# Patient Record
Sex: FEMALE | Race: WHITE | Employment: OTHER | URBAN - METROPOLITAN AREA
[De-identification: names, ages, dates, MRNs, and addresses within clinical notes are randomized per-mention and may not be internally consistent; named-entity substitution may affect disease eponyms.]

---

## 2019-09-03 ENCOUNTER — OFFICE VISIT (OUTPATIENT)
Dept: NEUROLOGY | Age: 79
End: 2019-09-03

## 2019-09-03 VITALS
OXYGEN SATURATION: 98 % | RESPIRATION RATE: 16 BRPM | WEIGHT: 120 LBS | HEART RATE: 70 BPM | HEIGHT: 65 IN | DIASTOLIC BLOOD PRESSURE: 70 MMHG | SYSTOLIC BLOOD PRESSURE: 120 MMHG | TEMPERATURE: 98.5 F | BODY MASS INDEX: 19.99 KG/M2

## 2019-09-03 DIAGNOSIS — R47.1 DYSARTHRIA: Primary | ICD-10-CM

## 2019-09-03 RX ORDER — PYRIDOSTIGMINE BROMIDE 60 MG/1
TABLET ORAL
Refills: 5 | COMMUNITY
Start: 2019-07-03

## 2019-09-03 RX ORDER — POLYETHYLENE GLYCOL 3350 17 G/17G
17 POWDER, FOR SOLUTION ORAL
COMMUNITY

## 2019-09-03 RX ORDER — METHOTREXATE 2.5 MG/1
TABLET ORAL
COMMUNITY
End: 2019-09-03 | Stop reason: SDUPTHER

## 2019-09-03 RX ORDER — BRIMONIDINE TARTRATE 2 MG/ML
SOLUTION/ DROPS OPHTHALMIC
Refills: 1 | COMMUNITY
Start: 2019-08-21

## 2019-09-03 RX ORDER — PREDNISONE 10 MG/1
TABLET ORAL
Refills: 3 | COMMUNITY
Start: 2019-05-29

## 2019-09-03 RX ORDER — PYRIDOSTIGMINE BROMIDE 60 MG/1
120 TABLET ORAL
Qty: 180 TAB | Refills: 1 | Status: CANCELLED | OUTPATIENT
Start: 2019-09-03

## 2019-09-03 RX ORDER — FOLIC ACID 1 MG/1
1 TABLET ORAL
COMMUNITY

## 2019-09-03 RX ORDER — DORZOLAMIDE HYDROCHLORIDE AND TIMOLOL MALEATE 20; 5 MG/ML; MG/ML
1 SOLUTION/ DROPS OPHTHALMIC
COMMUNITY

## 2019-09-03 RX ORDER — TRAVOPROST OPHTHALMIC SOLUTION 0.04 MG/ML
1 SOLUTION OPHTHALMIC
COMMUNITY

## 2019-09-03 RX ORDER — METHOTREXATE 2.5 MG/1
12.5 TABLET ORAL
Qty: 20 TAB | Refills: 3 | Status: SHIPPED | OUTPATIENT
Start: 2019-09-08

## 2019-09-03 RX ORDER — HYDRALAZINE HYDROCHLORIDE 50 MG/1
TABLET, FILM COATED ORAL
Refills: 5 | COMMUNITY
Start: 2019-07-03

## 2019-09-03 NOTE — LETTER
9/3/19 Patient: Madison Hopkins YOB: 1940 Date of Visit: 9/3/2019 Nikky Grewal MD 
[address] [facility] [address] [place] [address] VIA Facsimile: [phone] Dear Nikky Grewal MD, Thank you for referring Ms. Sherice Milan to [facility] for evaluation. My notes for this consultation are attached. If you have questions, please do not hesitate to call me. I look forward to following your patient along with you. Sincerely, Onelia Pino MD

## 2019-09-03 NOTE — PROGRESS NOTES
NEUROLOGY NEW PATIENT OFFICE CONSULTATION      9/3/2019    RE: Norman Boone         1940      REFERRED BY:  Inessa Arana MD        CHIEF COMPLAINT:  This is Norman Boone is a [de-identified] y.o. female right handed  who had concerns including New Patient (Patient is here to estab care with a new neurologist.  She used to see Dr. Duke Ly in [facility] and is now living with her daugther. She has MG. She has noticed she is having trouble swallowing again.). HPI:     History is limited due to patient is poor historian and daughter does not know details of her medical history. 20 yrs ago, patient started seeing a neurologist Dr. Lane Russell at [facility] and was diagnosed with myasthenia gravis. Apparently, not sure if she was (+) for Achr Abs. Patient was placed on Mestinon 60 mg 2.5 mg (150 mg) QID. Curiously, patient's speech is better if she misses her medication. Dr Mary Quiñones her PCP recently increased her Mestinon and Prednisone (she was on for 5 yrs) was increased to 20 mg every day. On Methotrexate 2.5 mg 3 tabs for 20 yrs. Denies SOB, weakness of arms, diplopia, ptosis.     ROS   (-) fever  (-) rash  All other systems reviewed and are negative    Past Medical Hx  Past Medical History:   Diagnosis Date    Asthma     Hypertension     Myasthenia gravis ([facility] [address]       HTN  R hip surgery  Lower back surgeries  R CTS 2005    Social Hx  Social History     Socioeconomic History    Marital status: SINGLE     Spouse name: Not on file    Number of children: Not on file    Years of education: Not on file    Highest education level: Not on file   Tobacco Use    Smoking status: Never Smoker    Smokeless tobacco: Never Used   Lives with daugther    Family Hx  Family History   Problem Relation Age of Onset    Heart Disease Father     Stroke Father        ALLERGIES  Allergies   Allergen Reactions    Cefzil [Cefprozil] Anaphylaxis    Codeine Itching    Erythromycin Anaphylaxis    Penicillins Anaphylaxis    Sulfa (Sulfonamide Antibiotics) Anaphylaxis    Clarithromycin Other (comments)     \"excessive coughing\"    Hydromorphone Itching    Benazepril Itching    Omeprazole Itching and Rash       CURRENT MEDS          PREVIOUS WORKUP: (reviewed)  IMAGING:    CT Results (recent):  No results found for this or any previous visit. MRI Results (recent):  No results found for this or any previous visit. IR Results (recent):  No results found for this or any previous visit. VAS/US Results (recent):  No results found for this or any previous visit. LABS (reviewed)  No results found for this or any previous visit. Physical Exam:     Visit Vitals  /70 (BP 1 Location: Right arm, BP Patient Position: Sitting)   Pulse 70   Temp 98.5 °F (36.9 °C) (Oral)   Resp 16   Ht 5' 5\" (1.651 m)   Wt 54.4 kg (120 lb)   SpO2 98%   BMI 19.97 kg/m²     General:  Alert, cooperative, no distress. Head:  Normocephalic, without obvious abnormality, atraumatic. Eyes:  Conjunctivae/corneas clear. Lungs:  Heart:   Non labored breathing  Regular rate and rhythm, no carotid bruits   Abdomen:   Soft, non-distended   Extremities: Extremities normal, atraumatic, no cyanosis or edema. Pulses: 2+ and symmetric all extremities. Skin: Skin color, texture, turgor normal. No rashes or lesions.   Neurologic Exam     Gen: Attention normal             Language: strained speech, will stutter occasionally             Memory: intact recent and remote memory  Cranial Nerves:  I: smell Not tested   II: visual fields Full to confrontation   II: pupils Equal, round, reactive to light   II: optic disc No papilledema   III,VII: ptosis none   III,IV,VI: extraocular muscles  Full ROM   V: mastication normal   V: facial light touch sensation  normal   VII: facial muscle function   symmetric   VIII: hearing symmetric   IX: soft palate elevation  normal   XI: trapezius strength  5/5   XI: sternocleidomastoid strength 5/5 XI: neck flexion strength  5/5   XII: tongue  midline     Motor: normal bulk and tone, no tremor              Strength: Limited exam of R LE due to R hip pain, otherwise, rest is 5/5 with no fatigability  (-) ptosis  Sensory: intact to LT, PP, vibration, and JPS  Reflexes: 2+ throughout; Down going toes  Coordination: Good FTN and HTS  Gait: arthralgic due to R hip pain, uses a walker         Impression:     Domi Wen is a [de-identified] y.o. female who  has a past medical history of Asthma, Hypertension, and Myasthenia gravis ([address] , esophageal strictures with multiple esophageal dilatation, R hip surgery, Lower back surgery,  R CTS 2005 who 20 yrs ago, started seeing a neurologist Dr. Regan Mohs at [facility] and was diagnosed with myasthenia gravis. (patient can't remember her symptoms). Apparently, not sure if she was (+) for Achr Abs. Patient was placed on Mestinon 60 mg 2.5 mg (150 mg) QID. Curiously, patient's speech is better if she misses her medication. Dr Cindy Juan her PCP recently increased her Mestinon and Prednisone (she was on for 5 yrs) was increased to 20 mg every day. On Methotrexate 2.5 mg 3 tabs for 20 yrs. Denies SOB, weakness of arms, diplopia, ptosis. RECOMMENDATIONS  1. I had a long discussion with patient and daugther. Discussed diagnosis, prognosis, pathophysiology and available treatment. Reviewed test results. All questions were answered. 2. It is curious to note that her speech is better when she misses Mestinon, so I wonder if Mestinon actually causing her speech issues (strained). There might be a non-organic component to her speech (stuttering that goes away when distracted). We need to reevaluate if she truly has myasthenia gravis before we can proceed further  3. Will recheck Achr Abs  4. Blood test for BHAVANA, ESR, RF to look for rheumatologic causes of her symptoms  5. Decrease Mestinon 60 mg to 2 tabs TID. Patient to monitor for worsening  6. Will check Methotrexate level.  In the meantime will continue Methotrexate 2.5 mg 5 tabs every Sunday  7. Will continue Prednisone 10 mg 2 tabs every day for now  8. Patient to get medical records from previous neurologists for my review  9. Patient is scheduled to see a GI specialist for her esophageal strictures        Follow-up and Dispositions    · Return in about 1 month (around 10/3/2019).             Thank you for the consultation      Tiffany Bernal MD  Diplomate, American Board of Psychiatry and Neurology  Diplomate, Neuromuscular Medicine  Diplomate, American Board of Electrodiagnostic Medicine        CC: Romana Reyes MD  Fax: [phone]

## 2019-09-03 NOTE — PROGRESS NOTES
Chief Complaint   Patient presents with    New Patient     Patient is here to estab care with a new neurologist.  She used to see Dr. Booker Michele in [facility] and is now living with her daugther. She has MG. She has noticed she is having trouble swallowing again.      Visit Vitals  /70 (BP 1 Location: Right arm, BP Patient Position: Sitting)   Pulse 70   Temp 98.5 °F (36.9 °C) (Oral)   Resp 16   Ht 5' 5\" (1.651 m)   Wt 120 lb (54.4 kg)   SpO2 98%   BMI 19.97 kg/m²

## 2019-09-10 LAB
ACHR BIND AB SER-SCNC: 0.22 NMOL/L (ref 0–0.24)
ACHR BLOCK AB SER-ACNC: 22 % (ref 0–25)
ACHR MOD AB/ACHR TOTAL SFR SER: <12 % (ref 0–20)
ANA SER QL: NEGATIVE
ERYTHROCYTE [SEDIMENTATION RATE] IN BLOOD BY WESTERGREN METHOD: 25 MM/HR (ref 0–40)
MTX SERPL-SCNC: NORMAL UMOL/L (ref 0.02–5)
RHEUMATOID FACT SERPL-ACNC: <10 IU/ML (ref 0–13.9)
STRIA MUS AB TITR SER IF: NEGATIVE {TITER}

## 2019-09-12 ENCOUNTER — DOCUMENTATION ONLY (OUTPATIENT)
Dept: NEUROLOGY | Age: 79
End: 2019-09-12

## 2019-09-12 NOTE — PROGRESS NOTES
Reviewed medical records from [facility] neurological consultant. EMG nerve study with  rep stim care of Dr. Hector Manzo August 3, 2004 apparently showed decremental response of the of the right facial and right spinal accessory nerve. EMG nerve study showed bilateral median neuropathy localized to the carpal tunnel moderately severe in the right and mild to moderate slightly severe in the left. 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 At that point her main complaint was paroxysmal stiffening in her hand and numbness of all of the digits involving the right greater than left hand. Intermittent right-sided ptosis as well as difficulty swallowing and chewing predominantly of solids. She also had some paroxysmal diplopia particularly when looking at yellow lines in the road. This appears to be horizontal.    MRI of the brain in 2004 apparently showed a chronic left thalamic stroke. Blood test done:  Apparently was positive for all 3 acetylcholine receptor antibodies. 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 Patient was started on Mestinon 60 mg 3 times a day and still complain of weakness in the afternoon with complaints of tightness and spasm in her hand along with numbness and claudication. Eventually Mestinon was increased to 1-1/2 tab 3 times a day and then to 2 tabs 3 times a day and was started on Imuran 50 mg twice daily on October 2005. Prednisone seemed to make her worse. In 2006 she is now on prednisone 20 mg a day and was switched to methotrexate 12.5 mg a week (7.5 mg on Sunday and 5 mg on Wednesdays) but it makes her feel sick sick on occasion. Also on Mestinon 60 mg 2 tablets 3 times a day. Patient was doing relatively well. In 2008 patient is on prednisone 10 mg a day. Also on methotrexate 2.5 mg 3 tabs and Sunday and 2 on Wednesdays and on Mestinon 60 mg 2-1/2 tab in the morning 2-1/2 tab at noon and 2 at 5 PM and 2 at bedtime.   She was also taking some folic acid and Fosamax. In 2018 she is having symptoms of esophageal stenosis for which she had prior esophageal dilatation procedures. Saw Dr. Mica Tompkins Dr. next    Patient was last seen by her previous neurologist on July 11, 2019. At that time she was complaining of cognitive changes. They noted her to have hesitant speech. At that time he was advised to continue her current myasthenia gravis medication of methotrexate 2.5 mg 5 tabs oral Sunday tabs and Wednesday, Mestinon 60 mg 2.5 in a.m. 4 times daily, prednisone 10 mg 2 tabs daily          Other history:  Multiple prior lumbar decompression, obstructive sleep apnea, trigeminal neuralgia, carpal tunnel syndrome, degenerative joint disease.

## 2019-10-10 ENCOUNTER — OFFICE VISIT (OUTPATIENT)
Dept: NEUROLOGY | Age: 79
End: 2019-10-10

## 2019-10-10 VITALS
DIASTOLIC BLOOD PRESSURE: 60 MMHG | SYSTOLIC BLOOD PRESSURE: 160 MMHG | TEMPERATURE: 98.7 F | RESPIRATION RATE: 16 BRPM | OXYGEN SATURATION: 95 % | HEIGHT: 65 IN | WEIGHT: 120 LBS | HEART RATE: 75 BPM | BODY MASS INDEX: 19.99 KG/M2

## 2019-10-10 DIAGNOSIS — F80.81 STAMMERING/STUTTERING: ICD-10-CM

## 2019-10-10 DIAGNOSIS — R47.1 DYSARTHRIA: Primary | ICD-10-CM

## 2019-10-10 NOTE — PATIENT INSTRUCTIONS
PRESCRIPTION REFILL POLICY [facility] Statement to Patients April 1, 2014 In an effort to ensure the large volume of patient prescription refills is processed in the most efficient and expeditious manner, we are asking our patients to assist us by calling your Pharmacy for all prescription refills, this will include also your  Mail Order Pharmacy. The pharmacy will contact our office electronically to continue the refill process. Please do not wait until the last minute to call your pharmacy. We need at least 48 hours (2days) to fill prescriptions. We also encourage you to call your pharmacy before going to  your prescription to make sure it is ready. With regard to controlled substance prescription refill requests (narcotic refills) that need to be picked up at our office, we ask your cooperation by providing us with at least 72 hours (3days) notice that you will need a refill. We will not refill narcotic prescription refill requests after 4:00pm on any weekday, Monday through Thursday, or after 2:00pm on Fridays, or on the weekends. We encourage everyone to explore another way of getting your prescription refill request processed using [employer], our patient web portal through our electronic medical record system. [employer] is an efficient and effective way to communicate your medication request directly to the office and  downloadable as an tasha on your smart phone . [employer] also features a review functionality that allows you to view your medication list as well as leave messages for your physician. Are you ready to get connected? If so please review the attatched instructions or speak to any of our staff to get you set up right away! Thank you so much for your cooperation. Should you have any questions please contact our Practice Administrator. The Physicians and Staff,  [facility]

## 2019-10-10 NOTE — LETTER
10/10/2019 4:39 PM 
 
Patient: Philomena Ward YOB: 1940 Date of Visit: 10/10/2019 Dear Vandana Estrada MD 
[address] [facility] [address] [place] [id number] VIA Facsimile: [phone] 
 : 
 
 
Thank you for referring Ms. Daniella Reza to me for evaluation/treatment. Below are the relevant portions of my assessment and plan of care. If you have questions, please do not hesitate to call me. I look forward to following Ms. Baez along with you. Sincerely, Mulugeta Chaparro MD

## 2019-10-10 NOTE — PROGRESS NOTES
Chief Complaint   Patient presents with    Results     Patient is here to discuss lab results to see if she has MG.      Visit Vitals  /60 (BP 1 Location: Right arm, BP Patient Position: Sitting)   Pulse 75   Temp 98.7 °F (37.1 °C) (Oral)   Resp 16   Ht 5' 5\" (1.651 m)   Wt 54.4 kg (120 lb)   SpO2 95%   BMI 19.97 kg/m²

## 2019-10-10 NOTE — PROGRESS NOTES
Neurology Progress Note    Patient ID:  Charli Franco  <[id number]>  10 y.o.  1940      Subjective:   History: Charli Franco is a [de-identified] y.o. female who  has a past medical history of Asthma, Hypertension, and Myasthenia gravis ([address] , esophageal strictures with multiple esophageal dilatation, R hip surgery, Lower back surgery,  R CTS 2005 who 20 yrs ago, started seeing a neurologist Dr. Collin Santos at [facility] [place] and was diagnosed with myasthenia gravis. (patient can't remember her symptoms). Apparently, not sure if she was (+) for Achr Abs. Patient was placed on Mestinon 60 mg 2.5 mg (150 mg) QID. Curiously, patient's speech is better if she misses her medication. Dr Eunice Wilson her PCP recently increased her Mestinon and Prednisone (she was on for 5 yrs) was increased to 20 mg every day. On Methotrexate 2.5 mg 3 tabs for 20 yrs. Denies SOB, weakness of arms, diplopia, ptosis. Since last time, patient feels symptoms have improved. Reviewed medical records from outside:  Reviewed medical records from [facility] neurological consultant.      EMG nerve study with  rep stim care of Dr. Hinton Koyanagi August 3, 2004 apparently showed decremental response of the of the right facial and right spinal accessory nerve.     EMG nerve study showed bilateral median neuropathy localized to the carpal tunnel moderately severe in the right and mild to moderate slightly severe in the left. 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     At that point her main complaint was paroxysmal stiffening in her hand and numbness of all of the digits involving the right greater than left hand. Intermittent right-sided ptosis as well as difficulty swallowing and chewing predominantly of solids. She also had some paroxysmal diplopia particularly when looking at yellow lines in the road.   This appears to be horizontal.     MRI of the brain in 2004 apparently showed a chronic left thalamic stroke.     Blood test done:  Apparently was positive for all 3 acetylcholine receptor antibodies. 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     Patient was started on Mestinon 60 mg 3 times a day and still complain of weakness in the afternoon with complaints of tightness and spasm in her hand along with numbness and claudication. Eventually Mestinon was increased to 1-1/2 tab 3 times a day and then to 2 tabs 3 times a day and was started on Imuran 50 mg twice daily on October 2005. Prednisone seemed to make her worse.     In 2006 she is now on prednisone 20 mg a day and was switched to methotrexate 12.5 mg a week (7.5 mg on Sunday and 5 mg on Wednesdays) but it makes her feel sick sick on occasion. Also on Mestinon 60 mg 2 tablets 3 times a day. Patient was doing relatively well.     In 2008 patient is on prednisone 10 mg a day. Also on methotrexate 2.5 mg 3 tabs and Sunday and 2 on Wednesdays and on Mestinon 60 mg 2-1/2 tab in the morning 2-1/2 tab at noon and 2 at 5 PM and 2 at bedtime. She was also taking some folic acid and Fosamax.     In 2018 she is having symptoms of esophageal stenosis for which she had prior esophageal dilatation procedures. Saw Dr. Karla Mcfarland Dr. next     Patient was last seen by her previous neurologist on July 11, 2019. At that time she was complaining of cognitive changes. They noted her to have hesitant speech.   At that time he was advised to continue her current myasthenia gravis medication of methotrexate 2.5 mg 5 tabs oral Sunday tabs and Wednesday, Mestinon 60 mg 2.5 in a.m. 4 times daily, prednisone 10 mg 2 tabs daily     Other history:  Multiple prior lumbar decompression, obstructive sleep apnea, trigeminal neuralgia, carpal tunnel syndrome, degenerative joint disease.            ROS:  Per HPI-  Otherwise the remainder of ROS was negative    Social Hx  [unfilled]    Meds:  Current Outpatient Medications on File Prior to Visit   Medication Sig Dispense Refill    acetaminophen (TYLENOL) 325 mg suppository Insert 325 mg into rectum.  albuterol sulfate 90 mcg/actuation aepb Take  by inhalation.  brimonidine (ALPHAGAN) 0.2 % ophthalmic solution INSTILL 1 DROP INTO BOTH EYES TWICE A DAY  1    dorzolamide-timolol (COSOPT) 22.3-6.8 mg/mL ophthalmic solution 1 Drop.  pyridostigmine (MESTINON) 60 mg tablet TAKE 2.5 TABLETS (150 MG TOTAL) BY MOUTH 4 (FOUR) TIMES DAILY  5    travoprost (TRAVATAN Z) 0.004 % ophthalmic solution 1 Drop.  folic acid (FOLVITE) 1 mg tablet Take 1 mg by mouth.  hydrALAZINE (APRESOLINE) 50 mg tablet TAKE 1 TABLET BY MOUTH EVERY 8 HOURS  5    polyethylene glycol (MIRALAX) 17 gram packet Take 17 g by mouth.  methotrexate (RHEUMATREX) 2.5 mg tablet Take 5 Tabs by mouth every Sunday. 20 Tab 3    predniSONE (DELTASONE) 10 mg tablet TAKE 6 TABLETS FOR 5 DAYS, THEN DECREASE BY 1 TABLET EVERY 2 DAYS UNTIL OFF  3     No current facility-administered medications on file prior to visit. Imaging:    CT Results (recent):  No results found for this or any previous visit. MRI Results (recent):  No results found for this or any previous visit. IR Results (recent):  No results found for this or any previous visit. VAS/US Results (recent):  No results found for this or any previous visit.     Reviewed records in [facility] tab today    Lab Review     Office Visit on 09/03/2019   Component Date Value Ref Range Status    Methotrexate (MTX), Serum 09/04/2019 None Detected  0.02 - 5.00 umol/L Final    Comment:                              Potentially Toxic Ranges:                                        After 24hr >5.00                                        After 48hr >0.50                                        After 72hr >0.05                                  Detection Limit = 0.02          This test was developed and its performance          characteristics determined by [employer]. It has          not been cleared or approved by the Food and          Drug Administration.  AChR Binding Ab, serum 09/04/2019 0.22  0.00 - 0.24 nmol/L Final    Comment:                                Negative:   0.00 - 0.24                                 Borderline: 0.25 - 0.40                                 Positive:        > 0.40      AChR Blocking Ab 09/04/2019 22  0 - 25 % Final    Comment:                                Negative:      0 - 25                                 Borderline:   26 - 30                                 Positive:         >30  Results for this test are for research purposes  only by the assay's . The performance  characteristics of this product have not been  established. Results should not be used as a  diagnostic procedure without confirmation of the  diagnosis by another medically established  diagnostic product or procedure.  AChR Modulating Ab 09/04/2019 <12  0 - 20 % Final    Comment:                               Negative:          <21                                Equivocal:     21 - 25                                Positive:          >25   The assay is linear between values of 12 and 64. Those <12 and >64 are reported as such. No single   value for ACR-modulating antibody should be used as   a sole basis for diagnosis or response to therapy.  Anti-striation Ab 09/04/2019 Negative  Neg:<1:40 Final    Antinuclear Antibodies Direct 09/04/2019 Negative  Negative Final    Sed rate (ESR) 09/04/2019 25  0 - 40 mm/hr Final    Rheumatoid factor 09/04/2019 <10.0  0.0 - 13.9 IU/mL Final         Objective:       Exam:  Visit Vitals  /60 (BP 1 Location: Right arm, BP Patient Position: Sitting)   Pulse 75   Temp 98.7 °F (37.1 °C) (Oral)   Resp 16   Ht 5' 5\" (1.651 m)   Wt 54.4 kg (120 lb)   SpO2 95%   BMI 19.97 kg/m²     Gen: Awake, alert, follows commands  Appropriate appearance  Oriented to all spheres.   No visual field defect on confrontation exam.  Full eyes movement, with no nystagmus, no diplopia, no ptosis. Normal gag and swallow. All remaining cranial nerves were normal  Language: strained speech, will stutter occasionally  Motor: normal bulk and tone, no tremor              Strength: Limited exam of R LE due to R hip pain, otherwise, rest is 5/5 with no fatigability  (-) ptosis  Sensory: intact to LT, PP, vibration, and JPS  Reflexes: 2+ throughout; Down going toes  Coordination: Good FTN and HTS  Gait: arthralgic due to R hip pain, uses a walker    Assessment:       ICD-10-CM ICD-9-CM    1. Dysarthria R47.1 784.51    2. Stammering/stuttering F80.81 315.35        Recent Achr Ab was negative (although historically she was positive as per her previous neurologist)    Plan:   1. I had a long discussion with patient and daughters. I recommend the most prudent thing to do is to start from scratch. 2. Since Methotrexate level is undetected (although daughter claims she takes them), she can just stop Methotrexate for now  3. Will also taper Mestinon to 60 mg 1 tab TID (given a schedule) since she felt she is better  4. Will continue Prednisone 10 mg 2 tabs every day for now  5. Follow up with GI specialist for her esophageal strictures      Follow-up and Dispositions    · Return in about 4 weeks (around 11/7/2019). Spent additional 1 hour reviewing medical records, lab results and imaging results from previous neurologist/physicans.           Todd Beach MD  Diplomate, American Board of Psychiatry and Neurology  Diplomate, Neuromuscular Medicine  Diplomate, American Board of Electrodiagnostic Medicine

## 2019-10-30 RX ORDER — BUDESONIDE AND FORMOTEROL FUMARATE DIHYDRATE 160; 4.5 UG/1; UG/1
AEROSOL RESPIRATORY (INHALATION)
COMMUNITY
Start: 2019-09-11

## 2019-10-30 RX ORDER — TAPENTADOL HYDROCHLORIDE 50 MG/1
TABLET, FILM COATED ORAL
Refills: 0 | COMMUNITY
Start: 2019-09-23

## 2019-10-31 ENCOUNTER — OFFICE VISIT (OUTPATIENT)
Dept: ORTHOPEDIC SURGERY | Age: 79
End: 2019-10-31

## 2019-10-31 VITALS
BODY MASS INDEX: 21.16 KG/M2 | WEIGHT: 127 LBS | TEMPERATURE: 98 F | OXYGEN SATURATION: 95 % | HEART RATE: 85 BPM | SYSTOLIC BLOOD PRESSURE: 136 MMHG | DIASTOLIC BLOOD PRESSURE: 58 MMHG | RESPIRATION RATE: 18 BRPM | HEIGHT: 65 IN

## 2019-10-31 DIAGNOSIS — M77.12 LATERAL EPICONDYLITIS, LEFT ELBOW: ICD-10-CM

## 2019-10-31 DIAGNOSIS — M70.61 TROCHANTERIC BURSITIS, RIGHT HIP: Primary | ICD-10-CM

## 2019-10-31 RX ORDER — HYDROCODONE BITARTRATE AND ACETAMINOPHEN 5; 325 MG/1; MG/1
TABLET ORAL
Refills: 0 | COMMUNITY
Start: 2019-08-06

## 2019-10-31 RX ORDER — KETOROLAC TROMETHAMINE 5 MG/ML
SOLUTION OPHTHALMIC
Refills: 1 | COMMUNITY
Start: 2019-10-24

## 2019-10-31 RX ORDER — BETAMETHASONE SODIUM PHOSPHATE AND BETAMETHASONE ACETATE 3; 3 MG/ML; MG/ML
6 INJECTION, SUSPENSION INTRA-ARTICULAR; INTRALESIONAL; INTRAMUSCULAR; SOFT TISSUE ONCE
Qty: 1 ML | Refills: 0
Start: 2019-10-31 | End: 2019-10-31

## 2019-10-31 NOTE — PROGRESS NOTES
10/31/2019    Chief Complaint: Right hip pain    HPI: This is a an 75-year-old female who did have a cephalo-medullary nail placed approximately 6 months ago at another facility. She did spend some time in the ICU, she has multiple medical issues. She is here for second opinion regarding her pains. She complains of right hip pain which is activity dependent. The patient has had activity dependent pain for the entirety of her 6 months recovery. The pain is located in the lateral aspect of the hip, it radiates somewhat distally and laterally, it is severe in intensity. The patient complains of difficulty sleeping on the right side, limitation in the normal activities of daily living. The patient has tried activity modification, she has tried over the counter pain medications, she is currently in physical therapy, injections have not been attempted. She denies any other surgery or pertinent history to this hip. She also complains of left elbow pain which is new since her injury. Of note, she was not on a walker prior to this issue.     Past Medical History:   Diagnosis Date    Asthma     Hypertension     Myasthenia gravis [facility])        Past Surgical History:   Procedure Laterality Date    HAND/FINGER SURGERY UNLISTED Right 11/2005    HX APPENDECTOMY      HX BACK SURGERY      x 3    HX CARPAL TUNNEL RELEASE Right 11/2005    HX CHOLECYSTECTOMY      HX HIP REPLACEMENT Right 06/01/2019    Dr. Eunice Travis ([facility])    HX KNEE ARTHROSCOPY      HX KNEE ARTHROSCOPY Right 06/2019    HX ORTHOPAEDIC  2006    arm/hand    HX ORTHOPAEDIC      foot surgery    HX ORTHOPAEDIC Right 11/2005    R elbow    HX TONSILLECTOMY      HX TOTAL ABDOMINAL HYSTERECTOMY      HX UROLOGICAL         Current Outpatient Medications on File Prior to Visit   Medication Sig Dispense Refill    HYDROcodone-acetaminophen (NORCO) 5-325 mg per tablet TAKE 1-2 TABLETS EVERY 4 HOURS AS NEEED FOR MODERATE OR SEVERE PAIN  0    ketorolac (ACULAR) 0.5 % ophthalmic solution INSTILL 1 DROP BY OPHTHALMIC ROUTE 4 TIMES EVERY DAY INTO EYE THAT HAD LASER X 1 WEEK THEN STOP  1    SYMBICORT 160-4.5 mcg/actuation HFAA       NUCYNTA 50 mg tablet TAKE 1 TABLET (50 MG TOTAL) BY MOUTH EVERY 6 (SIX) HOURS AS NEEDED FOR SEVERE PAIN FOR UP TO 14 DAYS  0    acetaminophen (TYLENOL) 325 mg suppository Insert 325 mg into rectum.  albuterol sulfate 90 mcg/actuation aepb Take  by inhalation.  brimonidine (ALPHAGAN) 0.2 % ophthalmic solution INSTILL 1 DROP INTO BOTH EYES TWICE A DAY  1    dorzolamide-timolol (COSOPT) 22.3-6.8 mg/mL ophthalmic solution 1 Drop.  pyridostigmine (MESTINON) 60 mg tablet TAKE 2.5 TABLETS (150 MG TOTAL) BY MOUTH 4 (FOUR) TIMES DAILY  5    travoprost (TRAVATAN Z) 0.004 % ophthalmic solution 1 Drop.  folic acid (FOLVITE) 1 mg tablet Take 1 mg by mouth.  hydrALAZINE (APRESOLINE) 50 mg tablet TAKE 1 TABLET BY MOUTH EVERY 8 HOURS  5    polyethylene glycol (MIRALAX) 17 gram packet Take 17 g by mouth.  methotrexate (RHEUMATREX) 2.5 mg tablet Take 5 Tabs by mouth every Sunday. 20 Tab 3    predniSONE (DELTASONE) 10 mg tablet TAKE 6 TABLETS FOR 5 DAYS, THEN DECREASE BY 1 TABLET EVERY 2 DAYS UNTIL OFF  3     No current facility-administered medications on file prior to visit. Allergies   Allergen Reactions    Benazepril Itching    Cefzil [Cefprozil] Anaphylaxis    Codeine Itching    Erythromycin Anaphylaxis    Penicillins Anaphylaxis    Sulfa (Sulfonamide Antibiotics) Anaphylaxis    Clarithromycin Other (comments)     \"excessive coughing\"    Hydrocodone Itching     Oxycodone, codeine excessive itching.      Hydromorphone Itching    Omeprazole Itching and Rash       Family History   Problem Relation Age of Onset    Heart Disease Father     Stroke Father        Social History     Socioeconomic History    Marital status: SINGLE     Spouse name: Not on file    Number of children: Not on file    Years of education: Not on file    Highest education level: Not on file   Tobacco Use    Smoking status: Never Smoker    Smokeless tobacco: Never Used   Substance and Sexual Activity    Alcohol use: Not Currently    Drug use: Never         Review of Systems:       General: Denies headache, lethargy, fever, weight loss  Ears/Nose/Throat: Denies ear discharge, drainage, nosebleeds, hoarse voice, dental problems  Cardiovascular: Denies chest pain, shortness of breath  Lungs: Denies chest pain, breathing problems, wheezing, pneumonia  Stomach: Denies stomach pain, heartburn, constipation, irritable bowel  Skin: Denies rash, sores, open wounds  Musculoskeletal: Admits to lateral hip pain and swelling, this pain is severe and causes difficulty walking. This pain is severe, made better by rest.   There is decreased mobility, and severe difficulty doing normal activities of daily living secondary to the pain. Genitourinary: Denies dysuria, hematuria, polyuria  Gastrointestinal: Denies constipation, obstipation, diarrhea  Neurological: Denies changes in sight, smell, hearing, taste, seizures. Denies loss of consciousness.   Psychiatric: Denies depression, sleep pattern changes, anxiety, change in personality  Endocrine: Denies mood swings, heat or cold intolerance  Hematologic/Lymphatic: Denies anemia, purpura, petechia  Allergic/Immunologic: Denies swelling of throat, pain or swelling at lymph nodes      Physical Examination:    Visit Vitals  /58 (BP 1 Location: Left arm, BP Patient Position: Sitting)   Pulse 85   Temp 98 °F (36.7 °C) (Oral)   Resp 18   Ht 5' 5\" (1.651 m)   Wt 127 lb (57.6 kg)   SpO2 95%   BMI 21.13 kg/m²        General: AOX3, no apparent distress  Psychiatric: mood and affect appropriate  Lungs: breathing is symmetric and unlabored bilaterally  Heart: regular rate and rhythm  Abdomen: no guarding  Head: normocephalic, atraumatic  Skin: No significant abnormalities, good turgor  Sensation intact to light touch: L1-S1 dermatomes  Muscular exam: 5/5 strength in all major muscle groups unless noted in specialty exam.    Extremities      Left upper extremity: Full active and passive range of motion without pain, deformity, no open wound, strength 5/5 in all major muscle groups. Right upper extremity: Full active and passive range of motion without pain, deformity, no open wound, strength 5/5 in all major muscle groups. Left lower extremity: Patient has significant tenderness to palpation in common extensor tendon insertion at the distal humerus, full active and passive range of motion is noted of the wrist, limited range of motion of the elbow secondary to pain in this regard. Wrist extension in a resisted fashion reproduces her pain. Right lower extremity:  No deformity is noted. Circumduction of the hip causes no pain in the groin. Palpation of the abductors as well as lateral aspect of the hip at the peritrochanteric bursa region is severely painful and reproductive of the chief complaint. Range of motion is full, with a negative impingement sign. VANDA test is negative. Gait is antalgic. Sensation is intact to light touch in the L1-S1 dermatomes. Skin is intact about the hip. Hip flexion strength is 5/5 and abduction strength is 4/5, hip extension and knee extension as well as tibialis anterior and EHL/GCS are 5/5 strength. Diagnostics:    Pertinent Xrays:  Pelvis and hip xrays indicate a healing intertrochanteric hip fracture with cephalo-medullary nail in good position. This is an image from July, there is partial healing at the time of this x-ray. Leg screw appears to be somewhat prominent laterally after collapse of the fracture. Assessment: Hardware irritation, right hip, lateral epicondylitis, left elbow    Plan: This patient and I did discuss at length the anatomy, which I drew out in office.   We discussed the potential causes of the issue, as well as the treatment options, which are largely nonoperative. We discussed that a mixture of anti-inflammatory analgesics, cortisone injections, as well as consistent physical therapy for 4-6 weeks is the standard of care of this issue. Evidence indicates that over 90% of patients can begin to resolve their issues in that time. The patient will proceed with ESR, CRP as well as aspiration attempt diagnostic and therapeutic injection into the right hip lag screw area, she will also have an injection into her lateral epicondylar area for symptomatic purposes as well as a physical therapy prescription. She will follow-up with her lab results as well as for cortisone into the area of leg screw irritation     procedures:     After consent was signed, the patient's right hip was prepped using a chlorhexidine scrub. Once  sterile, a timeout was performed and a 22 gauge needle was used to inject 5cc of 1% lidocaine through the subcutaneous tissues and iliotibial band in the peritrochanteric area nearest the apex of tenderness. Once adequate anesthesia was confirmed, a mixture of 6mg of betamethasone and 5 cc of 1% lidocaine were injected below the iliotibial band in the same area. Needle was removed and a sterile dressing applied. Patient tolerated well without complication. Post injection instructions were given. After this was performed, attention was turned to the left elbow where the area of maximum tenderness along the common extensor tendon of the left elbow was sterilized with chlorhexidine prep, I used 3 cc of 1% lidocaine into the subcutaneous tissues for esthesia. Once this was adequately numb, I used a mixture of 6 mg of betamethasone and 2 cc of 1% lidocaine injected into this area. Needle was removed and a sterile dressing was applied. Patient tolerated well without complication. Post instructions were given. Ms. Ina Carroll has a reminder for a \"due or due soon\" health maintenance.  I have asked that she contact her primary care provider for follow-up on this health maintenance.

## 2019-10-31 NOTE — PROGRESS NOTES
Chief Complaint   Patient presents with    Hip Pain     Right hip pain     1. Have you been to the ER, urgent care clinic since your last visit? Hospitalized since your last visit? No    2. Have you seen or consulted any other health care providers outside of the [address] [place] [address] since your last visit? Include any pap smears or colon screening.  No

## 2019-11-07 DIAGNOSIS — M70.61 TROCHANTERIC BURSITIS, RIGHT HIP: ICD-10-CM

## 2019-11-07 DIAGNOSIS — M77.12 LATERAL EPICONDYLITIS, LEFT ELBOW: ICD-10-CM

## 2019-11-07 LAB
CRP SERPL-MCNC: 82 MG/L (ref 0–10)
ERYTHROCYTE [SEDIMENTATION RATE] IN BLOOD BY WESTERGREN METHOD: 57 MM/HR (ref 0–40)

## 2019-11-20 ENCOUNTER — OFFICE VISIT (OUTPATIENT)
Dept: ORTHOPEDIC SURGERY | Age: 79
End: 2019-11-20

## 2019-11-20 VITALS
HEART RATE: 85 BPM | SYSTOLIC BLOOD PRESSURE: 118 MMHG | TEMPERATURE: 97.7 F | HEIGHT: 65 IN | RESPIRATION RATE: 24 BRPM | BODY MASS INDEX: 21.13 KG/M2 | DIASTOLIC BLOOD PRESSURE: 58 MMHG | OXYGEN SATURATION: 95 %

## 2019-11-20 DIAGNOSIS — M25.551 PAIN IN RIGHT HIP: Primary | ICD-10-CM

## 2019-11-20 RX ORDER — SUCRALFATE 1 G/10ML
SUSPENSION ORAL
Refills: 2 | COMMUNITY
Start: 2019-11-11

## 2019-11-20 NOTE — PROGRESS NOTES
11/20/2019      CC: Right hip pain    HPI:      This is a [de-identified]y.o. year old patient who comes in for follow-up of her right hip, she is here for results review as well as repeat clinical exam.  She continues to have lateral hip pain, she is getting worse with increased swelling on that side. I did send her for ESR and CRP, she did get better with an injection into the lateral aspect of the hip at the level of the lag screw, but this was just lidocaine. I did not want to give cortisone if her inflammatory markers were elevated for fear of infection, she does have now elevated ESR and CRP, worse than it was 2 months ago. Her clinical scenario is worsening as well. PMH:  Past Medical History:   Diagnosis Date    Asthma     Hypertension     Myasthenia gravis ([facility] [address]        PSxHx:  Past Surgical History:   Procedure Laterality Date    HAND/FINGER SURGERY UNLISTED Right 11/2005    HX APPENDECTOMY      HX BACK SURGERY      x 3    HX CARPAL TUNNEL RELEASE Right 11/2005    HX CHOLECYSTECTOMY      HX HIP REPLACEMENT Right 06/01/2019    Dr. Freddie Bianchi ([facility])    HX KNEE ARTHROSCOPY      HX KNEE ARTHROSCOPY Right 06/2019    HX ORTHOPAEDIC  2006    arm/hand    HX ORTHOPAEDIC      foot surgery    HX ORTHOPAEDIC Right 11/2005    R elbow    HX TONSILLECTOMY      HX TOTAL ABDOMINAL HYSTERECTOMY      HX UROLOGICAL         Meds:    Current Outpatient Medications:     CARAFATE 100 mg/mL suspension, TAKE 10ML BY MOUTH 3 TIMES A DAY AS NEEDED, Disp: , Rfl: 2    ketorolac (ACULAR) 0.5 % ophthalmic solution, INSTILL 1 DROP BY OPHTHALMIC ROUTE 4 TIMES EVERY DAY INTO EYE THAT HAD LASER X 1 WEEK THEN STOP, Disp: , Rfl: 1    SYMBICORT 160-4.5 mcg/actuation HFAA, , Disp: , Rfl:     NUCYNTA 50 mg tablet, TAKE 1 TABLET (50 MG TOTAL) BY MOUTH EVERY 6 (SIX) HOURS AS NEEDED FOR SEVERE PAIN FOR UP TO 14 DAYS, Disp: , Rfl: 0    acetaminophen (TYLENOL) 325 mg suppository, Insert 325 mg into rectum. , Disp: , Rfl:    albuterol sulfate 90 mcg/actuation aepb, Take  by inhalation. , Disp: , Rfl:     brimonidine (ALPHAGAN) 0.2 % ophthalmic solution, INSTILL 1 DROP INTO BOTH EYES TWICE A DAY, Disp: , Rfl: 1    dorzolamide-timolol (COSOPT) 22.3-6.8 mg/mL ophthalmic solution, 1 Drop., Disp: , Rfl:     predniSONE (DELTASONE) 10 mg tablet, TAKE 6 TABLETS FOR 5 DAYS, THEN DECREASE BY 1 TABLET EVERY 2 DAYS UNTIL OFF, Disp: , Rfl: 3    pyridostigmine (MESTINON) 60 mg tablet, TAKE 2.5 TABLETS (150 MG TOTAL) BY MOUTH 4 (FOUR) TIMES DAILY, Disp: , Rfl: 5    travoprost (TRAVATAN Z) 0.004 % ophthalmic solution, 1 Drop., Disp: , Rfl:     folic acid (FOLVITE) 1 mg tablet, Take 1 mg by mouth., Disp: , Rfl:     hydrALAZINE (APRESOLINE) 50 mg tablet, TAKE 1 TABLET BY MOUTH EVERY 8 HOURS, Disp: , Rfl: 5    polyethylene glycol (MIRALAX) 17 gram packet, Take 17 g by mouth., Disp: , Rfl:     methotrexate (RHEUMATREX) 2.5 mg tablet, Take 5 Tabs by mouth every Sunday. , Disp: 20 Tab, Rfl: 3    HYDROcodone-acetaminophen (NORCO) 5-325 mg per tablet, TAKE 1-2 TABLETS EVERY 4 HOURS AS NEEED FOR MODERATE OR SEVERE PAIN, Disp: , Rfl: 0    All: Allergies   Allergen Reactions    Benazepril Itching    Cefzil [Cefprozil] Anaphylaxis    Codeine Itching    Erythromycin Anaphylaxis    Penicillins Anaphylaxis    Sulfa (Sulfonamide Antibiotics) Anaphylaxis    Clarithromycin Other (comments)     \"excessive coughing\"    Hydrocodone Itching     Oxycodone, codeine excessive itching.      Hydromorphone Itching    Omeprazole Itching and Rash       Social Hx:  Social History     Socioeconomic History    Marital status: SINGLE     Spouse name: Not on file    Number of children: Not on file    Years of education: Not on file    Highest education level: Not on file   Tobacco Use    Smoking status: Never Smoker    Smokeless tobacco: Never Used   Substance and Sexual Activity    Alcohol use: Not Currently    Drug use: Never       Family Hx:  Family History Problem Relation Age of Onset    Heart Disease Father     Stroke Father          Review of Systems:       General: Denies headache, lethargy, fever, weight loss  Ears/Nose/Throat: Denies ear discharge, drainage, nosebleeds, hoarse voice, dental problems  Cardiovascular: Denies chest pain, shortness of breath  Lungs: Denies chest pain, breathing problems, wheezing, pneumonia  Stomach: Denies stomach pain, heartburn, constipation, irritable bowel  Skin: Denies rash, sores, open wounds  Musculoskeletal: Right hip pain  Genitourinary: Denies dysuria, hematuria, polyuria  Gastrointestinal: Denies constipation, obstipation, diarrhea  Neurological: Denies changes in sight, smell, hearing, taste, seizures. Denies loss of consciousness. Psychiatric: Denies depression, sleep pattern changes, anxiety, change in personality  Endocrine: Denies mood swings, heat or cold intolerance  Hematologic/Lymphatic: Denies anemia, purpura, petechia  Allergic/Immunologic: Denies swelling of throat, pain or swelling at lymph nodes      Physical Examination:    Visit Vitals  /58 (BP 1 Location: Left arm, BP Patient Position: Sitting)   Pulse 85   Temp 97.7 °F (36.5 °C) (Oral)   Resp 24   Ht 5' 5\" (1.651 m)   SpO2 95%   BMI 21.13 kg/m²        General: AOX3, no apparent distress  Psychiatric: mood and affect appropriate  Lungs: breathing is symmetric and unlabored bilaterally  Heart: regular rate and rhythm  Abdomen: no guarding  Head: normocephalic, atraumatic  Skin: No significant abnormalities, good turgor  Sensation intact to light touch: C5-T1 dermatomes  Muscular exam: 5/5 strength in all major muscle groups unless noted in specialty exam.    Extremities      Right upper extremity: No exam  Left upper extremity: No exam  Right lower extremity: Severe tenderness to palpation at the level of the trochanteric nail lag screw. No fluctuance, no erythema, sensation is intact light touch in the L1-S1 dermatomes.   Ambulates with a walker, very unsteady gait. Left lower extremity: Extremity is examined, no evidence of gross deformity, no gross motor or sensory deficit. Full active and passive range of motion is noted. Muscle tone and bulk is within normal expected limits. Capillary refill is less than 2 seconds distally. 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  I have advised the mother and the daughter that this is a significant undertaking even though it would be a relatively limited surgery, the plan would be to remove the hardware and simply replace a shorter leg screw if this is a noninfected prosthesis, however if it is infected, this would require long-term antibiotics as well as hardware removal.  She stated her understanding and satisfaction, the plan will be to have her proceed with indium 111 scan, this is to rule out chronic infection. If it comes back even moderately equivocal, plan will be to proceed with long-term antibiotics with assumption that this is infected hardware. She stated her understanding and satisfaction, additionally, I will speak with her primary regarding pain management. Ms. Mj Sumner has a reminder for a \"due or due soon\" health maintenance. I have asked that she contact her primary care provider for follow-up on this health maintenance.

## 2019-11-20 NOTE — PROGRESS NOTES
Chief Complaint   Patient presents with    Hip Pain     f/u    Elbow Pain     f/u    Foot Pain     1. Have you been to the ER, urgent care clinic since your last visit? Hospitalized since your last visit? No    2. Have you seen or consulted any other health care providers outside of the [address] [place] [address] since your last visit? Include any pap smears or colon screening. No     Pt is in extreme pain, unable to sleep.

## 2019-11-22 ENCOUNTER — TELEPHONE (OUTPATIENT)
Dept: ORTHOPEDIC SURGERY | Age: 79
End: 2019-11-22

## 2019-11-26 DIAGNOSIS — M25.551 PAIN IN RIGHT HIP: Primary | ICD-10-CM

## 2019-11-27 DIAGNOSIS — M25.551 PAIN IN RIGHT HIP: Primary | ICD-10-CM

## 2019-12-09 ENCOUNTER — OFFICE VISIT (OUTPATIENT)
Dept: ORTHOPEDIC SURGERY | Age: 79
End: 2019-12-09

## 2019-12-09 VITALS
SYSTOLIC BLOOD PRESSURE: 132 MMHG | OXYGEN SATURATION: 96 % | TEMPERATURE: 98 F | HEIGHT: 65 IN | BODY MASS INDEX: 21.13 KG/M2 | HEART RATE: 84 BPM | DIASTOLIC BLOOD PRESSURE: 78 MMHG | RESPIRATION RATE: 18 BRPM

## 2019-12-09 DIAGNOSIS — T84.89XA: Primary | ICD-10-CM

## 2019-12-09 PROBLEM — T84.9XXA COMPLICATION OF ORTHOPEDIC DEVICE (HCC): Status: ACTIVE | Noted: 2019-12-09

## 2019-12-09 NOTE — PROGRESS NOTES
Chief Complaint   Patient presents with    Hip Pain     f/u     1. Have you been to the ER, urgent care clinic since your last visit? Hospitalized since your last visit? No    2. Have you seen or consulted any other health care providers outside of the [address] [place] [address] since your last visit? Include any pap smears or colon screening.  No      Pt has not been going to PT.

## 2019-12-10 NOTE — PROGRESS NOTES
12/9/2019      CC: Right hip pain    HPI:      This is a 78y.o. year old female who  presents for a follow up visit. The patient was last seen and diagnosed with hardware irritation right femur. The patient's treatments since the most recent visit have comprised of workup for infection, which was negative. The patient has had no relief of the chief complaint. Additionally, her pain limits all walking. PMH:  Past Medical History:   Diagnosis Date    Asthma     Hypertension     Myasthenia gravis ([address]        PSxHx:  Past Surgical History:   Procedure Laterality Date    HAND/FINGER SURGERY UNLISTED Right 11/2005    HX APPENDECTOMY      HX BACK SURGERY      x 3    HX CARPAL TUNNEL RELEASE Right 11/2005    HX CHOLECYSTECTOMY      HX HIP REPLACEMENT Right 06/01/2019    Dr. Grace Topete ([facility])    HX KNEE ARTHROSCOPY      HX KNEE ARTHROSCOPY Right 06/2019    HX ORTHOPAEDIC  2006    arm/hand    HX ORTHOPAEDIC      foot surgery    HX ORTHOPAEDIC Right 11/2005    R elbow    HX TONSILLECTOMY      HX TOTAL ABDOMINAL HYSTERECTOMY      HX UROLOGICAL         Meds:    Current Outpatient Medications:     CARAFATE 100 mg/mL suspension, TAKE 10ML BY MOUTH 3 TIMES A DAY AS NEEDED, Disp: , Rfl: 2    HYDROcodone-acetaminophen (NORCO) 5-325 mg per tablet, TAKE 1-2 TABLETS EVERY 4 HOURS AS NEEED FOR MODERATE OR SEVERE PAIN, Disp: , Rfl: 0    ketorolac (ACULAR) 0.5 % ophthalmic solution, INSTILL 1 DROP BY OPHTHALMIC ROUTE 4 TIMES EVERY DAY INTO EYE THAT HAD LASER X 1 WEEK THEN STOP, Disp: , Rfl: 1    SYMBICORT 160-4.5 mcg/actuation HFAA, , Disp: , Rfl:     NUCYNTA 50 mg tablet, TAKE 1 TABLET (50 MG TOTAL) BY MOUTH EVERY 6 (SIX) HOURS AS NEEDED FOR SEVERE PAIN FOR UP TO 14 DAYS, Disp: , Rfl: 0    acetaminophen (TYLENOL) 325 mg suppository, Insert 325 mg into rectum. , Disp: , Rfl:     albuterol sulfate 90 mcg/actuation aepb, Take  by inhalation. , Disp: , Rfl:     brimonidine (ALPHAGAN) 0.2 % ophthalmic solution, INSTILL 1 DROP INTO BOTH EYES TWICE A DAY, Disp: , Rfl: 1    dorzolamide-timolol (COSOPT) 22.3-6.8 mg/mL ophthalmic solution, 1 Drop., Disp: , Rfl:     predniSONE (DELTASONE) 10 mg tablet, TAKE 6 TABLETS FOR 5 DAYS, THEN DECREASE BY 1 TABLET EVERY 2 DAYS UNTIL OFF, Disp: , Rfl: 3    pyridostigmine (MESTINON) 60 mg tablet, TAKE 2.5 TABLETS (150 MG TOTAL) BY MOUTH 4 (FOUR) TIMES DAILY, Disp: , Rfl: 5    travoprost (TRAVATAN Z) 0.004 % ophthalmic solution, 1 Drop., Disp: , Rfl:     hydrALAZINE (APRESOLINE) 50 mg tablet, TAKE 1 TABLET BY MOUTH EVERY 8 HOURS, Disp: , Rfl: 5    polyethylene glycol (MIRALAX) 17 gram packet, Take 17 g by mouth., Disp: , Rfl:     folic acid (FOLVITE) 1 mg tablet, Take 1 mg by mouth., Disp: , Rfl:     methotrexate (RHEUMATREX) 2.5 mg tablet, Take 5 Tabs by mouth every Sunday. (Patient not taking: Reported on 12/9/2019), Disp: 20 Tab, Rfl: 3    All: Allergies   Allergen Reactions    Benazepril Itching    Cefzil [Cefprozil] Anaphylaxis    Codeine Itching    Erythromycin Anaphylaxis    Penicillins Anaphylaxis    Sulfa (Sulfonamide Antibiotics) Anaphylaxis    Clarithromycin Other (comments)     \"excessive coughing\"    Hydrocodone Itching     Oxycodone, codeine excessive itching.      Hydromorphone Itching    Omeprazole Itching and Rash       Social Hx:  Social History     Socioeconomic History    Marital status: SINGLE     Spouse name: Not on file    Number of children: Not on file    Years of education: Not on file    Highest education level: Not on file   Tobacco Use    Smoking status: Never Smoker    Smokeless tobacco: Never Used   Substance and Sexual Activity    Alcohol use: Not Currently    Drug use: Never       Family Hx:  Family History   Problem Relation Age of Onset    Heart Disease Father     Stroke Father          Review of Systems:       General: Denies headache, lethargy, fever, weight loss  Ears/Nose/Throat: Denies ear discharge, drainage, nosebleeds, hoarse voice, dental problems  Cardiovascular: Denies chest pain, shortness of breath  Lungs: Denies chest pain, breathing problems, wheezing, pneumonia  Stomach: Denies stomach pain, heartburn, constipation, irritable bowel  Skin: Denies rash, sores, open wounds  Musculoskeletal: right hip pain  Genitourinary: Denies dysuria, hematuria, polyuria  Gastrointestinal: Denies constipation, obstipation, diarrhea  Neurological: Denies changes in sight, smell, hearing, taste, seizures. Denies loss of consciousness. Psychiatric: Denies depression, sleep pattern changes, anxiety, change in personality  Endocrine: Denies mood swings, heat or cold intolerance  Hematologic/Lymphatic: Denies anemia, purpura, petechia  Allergic/Immunologic: Denies swelling of throat, pain or swelling at lymph nodes      Physical Examination:    Visit Vitals  /78 (BP 1 Location: Right arm, BP Patient Position: Sitting)   Pulse 84   Temp 98 °F (36.7 °C) (Oral)   Resp 18   Ht 5' 5\" (1.651 m)   SpO2 96%   BMI 21.13 kg/m²        General: AOX3, no apparent distress  Psychiatric: mood and affect appropriate  Lungs: breathing is symmetric and unlabored bilaterally  Heart: regular rate and rhythm  Abdomen: no guarding  Head: normocephalic, atraumatic  Skin: No significant abnormalities, good turgor  Sensation intact to light touch: C5-T1 dermatomes  Muscular exam: 5/5 strength in all major muscle groups unless noted in specialty exam.    Extremities      Right upper extremity: no exam  Left upper extremity:  No exam  Right lower extremity: No gross deformity. + TTP right hip which reproduces the chief complaint. No restriction to range of motion of the hip, knee, ankle. Muscle bulk is appropriate without wasting. Sensation is intact to light touch in the L1-S1 dermatomes. Capillary refill is less than 2 seconds in the fingers. Strength testing is 5/5 at the major muscle groups of the hip, knee, ankle.       Left lower extremity: No gross deformity. No restriction to range of motion of the hip, knee, ankle. Muscle bulk is appropriate without wasting. Sensation is intact to light touch in the L1-S1 dermatomes. Capillary refill is less than 2 seconds in the fingers. Strength testing is 5/5 at the major muscle groups of the hip, knee, ankle. Diagnostics:    Pertinent Diagnostics: none today    Assessment: hardware irritation right hip implant  Plan:    I discussed with the patient and daughter that pain management is the only treatment other than revision surgery to a shorter lag screw. Nonoperative management carries morbidity, as does surgery. She is looking for a definitive option. We did discuss the risks of surgery which include but are not limited to infection, nerve or blood vessel damage, failure of fixation, failure of any possible implant, need for reoperation, postoperative pain and swelling, intra-or postoperative fracture, postoperative dislocation, leg length inequality, need for reoperation, implant failure, death, disability, organ dysfunction, wound healing issues, DVT, PE, and the need for further procedures. The patient did freely state their understanding and satisfaction with our discussion. We will proceed after medical clearances. Ms. Shawn Noble has a reminder for a \"due or due soon\" health maintenance. I have asked that she contact her primary care provider for follow-up on this health maintenance.

## 2019-12-16 ENCOUNTER — OFFICE VISIT (OUTPATIENT)
Dept: ORTHOPEDIC SURGERY | Age: 79
End: 2019-12-16

## 2019-12-16 VITALS
HEIGHT: 65 IN | TEMPERATURE: 97.8 F | SYSTOLIC BLOOD PRESSURE: 156 MMHG | HEART RATE: 94 BPM | DIASTOLIC BLOOD PRESSURE: 64 MMHG | OXYGEN SATURATION: 96 % | WEIGHT: 126 LBS | BODY MASS INDEX: 20.99 KG/M2

## 2019-12-16 DIAGNOSIS — M25.461 EFFUSION, RIGHT KNEE: ICD-10-CM

## 2019-12-16 DIAGNOSIS — M25.561 ACUTE PAIN OF RIGHT KNEE: Primary | ICD-10-CM

## 2019-12-16 RX ORDER — BETAMETHASONE SODIUM PHOSPHATE AND BETAMETHASONE ACETATE 3; 3 MG/ML; MG/ML
6 INJECTION, SUSPENSION INTRA-ARTICULAR; INTRALESIONAL; INTRAMUSCULAR; SOFT TISSUE ONCE
Qty: 1 ML | Refills: 0
Start: 2019-12-16 | End: 2019-12-16

## 2019-12-16 NOTE — PROGRESS NOTES
12/16/2019    Chief Complaint: Right knee pain    HPI: This is a(n) 78 y.o. patient who complains of right knee pain. Onset was sudden. The patient has had activity dependent pain for 2 weeks. The patient has tried activity modification, she has not tried physical therapy, injections have not been attempted. The pain is in the knee and goes distally to the ankle which is associated with swelling, it is moderate in intensity. The pain causes some limitation with ambulation. The patient does not complain of feelings of instability in the knee. Past Medical History:   Diagnosis Date    Asthma     Hypertension     Myasthenia gravis ([address]     Other ill-defined conditions(799.89)     myasthenia gravis       Past Surgical History:   Procedure Laterality Date    HAND/FINGER SURGERY UNLISTED Right 11/2005    HX APPENDECTOMY      HX BACK SURGERY      x 3    HX CARPAL TUNNEL RELEASE Right 11/2005    HX CHOLECYSTECTOMY      HX GYN      hysterectomy    HX HEENT      T&A    HX HIP REPLACEMENT Right 06/01/2019    Dr. Dorina Crowe ([facility])    HX KNEE ARTHROSCOPY      HX KNEE ARTHROSCOPY Right 06/2019    HX ORTHOPAEDIC      back    HX ORTHOPAEDIC      knee, arm, hand, foot,     HX ORTHOPAEDIC  2006    arm/hand    HX ORTHOPAEDIC      foot surgery    HX ORTHOPAEDIC Right 11/2005    R elbow    HX TONSILLECTOMY      HX TOTAL ABDOMINAL HYSTERECTOMY      HX UROLOGICAL      bladder tac    HX UROLOGICAL         Current Outpatient Medications on File Prior to Visit   Medication Sig Dispense Refill    CARAFATE 100 mg/mL suspension TAKE 10ML BY MOUTH 3 TIMES A DAY AS NEEDED  2    ketorolac (ACULAR) 0.5 % ophthalmic solution INSTILL 1 DROP BY OPHTHALMIC ROUTE 4 TIMES EVERY DAY INTO EYE THAT HAD LASER X 1 WEEK THEN STOP  1    SYMBICORT 160-4.5 mcg/actuation HFAA       acetaminophen (TYLENOL) 325 mg suppository Insert 325 mg into rectum.  albuterol sulfate 90 mcg/actuation aepb Take  by inhalation.  brimonidine (ALPHAGAN) 0.2 % ophthalmic solution INSTILL 1 DROP INTO BOTH EYES TWICE A DAY  1    dorzolamide-timolol (COSOPT) 22.3-6.8 mg/mL ophthalmic solution 1 Drop.  predniSONE (DELTASONE) 10 mg tablet TAKE 6 TABLETS FOR 5 DAYS, THEN DECREASE BY 1 TABLET EVERY 2 DAYS UNTIL OFF  3    pyridostigmine (MESTINON) 60 mg tablet TAKE 2.5 TABLETS (150 MG TOTAL) BY MOUTH 4 (FOUR) TIMES DAILY  5    travoprost (TRAVATAN Z) 0.004 % ophthalmic solution 1 Drop.  polyethylene glycol (MIRALAX) 17 gram packet Take 17 g by mouth.  amLODIPine (NORVASC) 5 mg tablet Take 5 mg by mouth daily.  atenolol (TENORMIN) 25 mg tablet Take 25 mg by mouth daily.  pravastatin (PRAVACHOL) 20 mg tablet Take 20 mg by mouth daily.  pyridostigmine (MESTINON) 60 mg tablet Take 60 mg by mouth four (4) times daily. 2.5 tabs at am and noon, 2 tabs at 1700 and bed time.  Cholecalciferol, Vitamin D3, (VITAMIN D3) 1,000 unit cap Take 1,000 Units by mouth two (2) times a day.  MULTIVITAMIN WITH MINERALS (ELFEGO-WILLARD PO) Take 1 Tab by mouth two (2) times a day.  HYDROcodone-guaiFENesin (HYCOTUSS) 5-100 mg/5 mL syrup Take 5 mL by mouth four (4) times daily as needed.  travoprost (TRAVATAN Z) 0.004 % ophthalmic solution Administer 1 Drop to both eyes every evening.  HYDROcodone-acetaminophen (NORCO) 5-325 mg per tablet TAKE 1-2 TABLETS EVERY 4 HOURS AS NEEED FOR MODERATE OR SEVERE PAIN  0    NUCYNTA 50 mg tablet TAKE 1 TABLET (50 MG TOTAL) BY MOUTH EVERY 6 (SIX) HOURS AS NEEDED FOR SEVERE PAIN FOR UP TO 14 DAYS  0    folic acid (FOLVITE) 1 mg tablet Take 1 mg by mouth.  hydrALAZINE (APRESOLINE) 50 mg tablet TAKE 1 TABLET BY MOUTH EVERY 8 HOURS  5    methotrexate (RHEUMATREX) 2.5 mg tablet Take 5 Tabs by mouth every Sunday. (Patient not taking: Reported on 12/9/2019) 20 Tab 3    methotrexate (RHEUMATREX) 2.5 mg tablet Take 2.5 mg by mouth.       folic acid (FOLVITE) 1 mg tablet Take 1 mg by mouth daily.  aspirin 81 mg tablet Take 81 mg by mouth daily. No current facility-administered medications on file prior to visit. Allergies   Allergen Reactions    Benazepril Itching    Cefzil [Cefprozil] Anaphylaxis    Codeine Itching    Erythromycin Anaphylaxis    Omeprazole Unknown (comments)    Penicillins Anaphylaxis    Sulfa (Sulfonamide Antibiotics) Anaphylaxis    Benazepril Shortness of Breath    Biaxin [Clarithromycin] Swelling    Cefzil [Cefprozil] Swelling    Clarithromycin Other (comments)     \"excessive coughing\"    Codeine Hives    Erythromycin Swelling    Hydrocodone Itching     Oxycodone, codeine excessive itching.  Hydromorphone Itching    Pcn [Penicillins] Swelling    Sulfa (Sulfonamide Antibiotics) Swelling    Omeprazole Itching and Rash       Family History   Problem Relation Age of Onset    Heart Disease Father     Stroke Father        Social History     Socioeconomic History    Marital status: SINGLE     Spouse name: Not on file    Number of children: Not on file    Years of education: Not on file    Highest education level: Not on file   Tobacco Use    Smoking status: Never Smoker    Smokeless tobacco: Never Used   Substance and Sexual Activity    Alcohol use: Not Currently    Drug use: Never   Social History Narrative    ** Merged History Encounter **              Review of Systems:       General: Denies headache, lethargy, fever, weight loss  Ears/Nose/Throat: Denies ear discharge, drainage, nosebleeds, hoarse voice, dental problems  Cardiovascular: Denies chest pain, shortness of breath  Lungs: Denies chest pain, breathing problems, wheezing, pneumonia  Stomach: Denies stomach pain, heartburn, constipation, irritable bowel  Skin: Denies rash, sores, open wounds  Musculoskeletal: Admits to knee pain, no deformity.   Genitourinary: Denies dysuria, hematuria, polyuria  Gastrointestinal: Denies constipation, obstipation, diarrhea  Neurological: Denies changes in sight, smell, hearing, taste, seizures. Denies loss of consciousness. Psychiatric: Denies depression, sleep pattern changes, anxiety, change in personality  Endocrine: Denies mood swings, heat or cold intolerance  Hematologic/Lymphatic: Denies anemia, purpura, petechia  Allergic/Immunologic: Denies swelling of throat, pain or swelling at lymph nodes      Physical Examination:    Visit Vitals  /64 (BP 1 Location: Right arm, BP Patient Position: Sitting)   Pulse 94   Temp 97.8 °F (36.6 °C) (Oral)   Ht 5' 5\" (1.651 m)   Wt 126 lb (57.2 kg)   SpO2 96%   BMI 20.97 kg/m²        General: AOX3, no apparent distress  Psychiatric: mood and affect appropriate  Lungs: breathing is symmetric and unlabored bilaterally  Heart: regular rate and rhythm  Abdomen: no guarding  Head: normocephalic, atraumatic  Skin: No significant abnormalities, good turgor  Sensation intact to light touch: L1-S1 dermatomes  Muscular exam: 5/5 strength in all major muscle groups unless noted in specialty exam.    Extremities:      Left upper extremity: Full active and passive range of motion without pain, deformity, no open wound, strength 5/5 in all major muscle groups. Right upper extremity: Full active and passive range of motion without pain, deformity, no open wound, strength 5/5 in all major muscle groups. Left lower extremity: Full active and passive range of motion without pain, deformity, no open wound, strength 5/5 in all major muscle groups. Right lower extremity:  No deformity is noted. Range of motion of the knee is 0-125. Ligamentous testing of the knee indicates stability of the the MCL, LCL, PCL, and ACL. Lachman's, anterior and posterior drawer tests are specifically negative. No joint line tenderness to palpation medially or laterally. Popliteal area is unremarkable. Massive  effusion. No patellar crepitus.   Patella tracks centrally with a negative apprehension and grind test.  Pivot shift is negative. Strength testing is indicative of 5/5 strength at hip flexion, extension, knee flexion and extension, tibialis anterior, EHL, and FHL. Sensation is intact to light touch in the L1-S1 dermatomes. Capillary refill is less than 2 seconds in the toes. Diagnostics:    Pertinent Xrays:  Xrays are not taken today. Assessment: Right knee effusion, pain    Plan: This patient does have the above-mentioned issue, I have suggested an aspiration injection for symptomatic management. Additionally, she would like to proceed with surgery for removal of her cephalo-medullary lag screw. Plan will be to proceed with surgery at a later date of her choosing, but we will perform an aspiration and injection today for symptomatic purposes. PROCEDURE NOTE:     After consent was obtained, the superolateral aspect of the right knee was prepped in the standard fashion with chlorhexidine scrub. Once this was allowed to dry, a timeout was taken and the skin and capsule were injected using 6cc of 1% lidocaine through a 22 gauge needle. Once adequate analgesia was confirmed, the same site was entered with an 18 gauge needle, and 25 of clear appearing synovial fluid was aspirated. Once the joint was decompressed, an injection of a mixture of 6mg of betamethasone and 1% lidocaine without epinephrine was administered. The needle was then withdrawn and the injection site dressed with a sterile bandage at the conclusion. The procedure was well tolerated by the patient. No immediate adverse reactions were noted. the needle was extracted and a sterile dressing was applied. The patient tolerated well. Post injection instructions were given. Ms. Mj Sumner has a reminder for a \"due or due soon\" health maintenance. I have asked that she contact her primary care provider for follow-up on this health maintenance.

## 2019-12-16 NOTE — PROGRESS NOTES
Oliva Gaines is a 78 y.o. female  Chief Complaint   Patient presents with    Joint Swelling     RT foot     Visit Vitals  /64 (BP 1 Location: Right arm, BP Patient Position: Sitting)   Pulse 94   Temp 97.8 °F (36.6 °C) (Oral)   Ht 5' 5\" (1.651 m)   Wt 126 lb (57.2 kg)   SpO2 96%   BMI 20.97 kg/m²     1. Have you been to the ER, urgent care clinic since your last visit? Hospitalized since your last visit? No    2. Have you seen or consulted any other health care providers outside of the [address] [place] [address] since your last visit? Include any pap smears or colon screening.  No